# Patient Record
Sex: MALE
[De-identification: names, ages, dates, MRNs, and addresses within clinical notes are randomized per-mention and may not be internally consistent; named-entity substitution may affect disease eponyms.]

---

## 2023-06-13 PROBLEM — Z00.00 ENCOUNTER FOR PREVENTIVE HEALTH EXAMINATION: Status: ACTIVE | Noted: 2023-06-13

## 2023-06-21 ENCOUNTER — APPOINTMENT (OUTPATIENT)
Dept: ORTHOPEDIC SURGERY | Facility: CLINIC | Age: 28
End: 2023-06-21
Payer: COMMERCIAL

## 2023-06-21 VITALS — BODY MASS INDEX: 34.86 KG/M2 | HEIGHT: 68 IN | WEIGHT: 230 LBS

## 2023-06-21 DIAGNOSIS — Z78.9 OTHER SPECIFIED HEALTH STATUS: ICD-10-CM

## 2023-06-21 DIAGNOSIS — M75.51 BURSITIS OF RIGHT SHOULDER: ICD-10-CM

## 2023-06-21 PROCEDURE — 99204 OFFICE O/P NEW MOD 45 MIN: CPT

## 2023-06-21 PROCEDURE — 73030 X-RAY EXAM OF SHOULDER: CPT | Mod: RT

## 2023-06-21 RX ORDER — MELOXICAM 15 MG/1
15 TABLET ORAL
Qty: 30 | Refills: 0 | Status: COMPLETED | COMMUNITY
Start: 2023-06-21 | End: 2023-07-21

## 2023-06-22 NOTE — HISTORY OF PRESENT ILLNESS
[3] : 3 [0] : 0 [Dull/Aching] : dull/aching [Localized] : localized [Occasional] : occasional [Leisure] : leisure [Rest] : rest [Ice] : ice [Exercising] : exercising [Full time] : Work status: full time [de-identified] : 06/21/23" 28 y/o RHD male c/o right shoulder pain for the past month. No specific injury, but started after playing softball. Describes anterior shoulder pain that is worse with activity. Denies radiating pain. Temporary relief with ice. 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 No prior issues. No prior Tx.  [de-identified] : activity [de-identified] : none [de-identified] :

## 2023-06-22 NOTE — ASSESSMENT
[FreeTextEntry1] : 06/21/23: X-rays today - unremarkable\par Pathology and treatment options reviewed\par Mobic rx\par Start PT\par MRI if no improvement \par f/u 4-6 weeks as needed\par \par Progress note completed by MANUELA Mason under the direct supervision of Arun Lockhart M.D.\par

## 2023-06-22 NOTE — PHYSICAL EXAM
[Right] : right shoulder [5 ___] : forward flexion 5[unfilled]/5 [5___] : internal rotation 5[unfilled]/5 [] : no erythema [TWNoteComboBox7] : active forward flexion 170 degrees [TWNoteComboBox6] : internal rotation L3